# Patient Record
Sex: MALE | Race: WHITE | NOT HISPANIC OR LATINO | Employment: FULL TIME | URBAN - NONMETROPOLITAN AREA
[De-identification: names, ages, dates, MRNs, and addresses within clinical notes are randomized per-mention and may not be internally consistent; named-entity substitution may affect disease eponyms.]

---

## 2023-02-12 PROBLEM — F41.0 PANIC ATTACK: Status: ACTIVE | Noted: 2023-02-12

## 2023-02-12 PROBLEM — I10 HYPERTENSION: Status: ACTIVE | Noted: 2023-02-12

## 2023-02-12 PROBLEM — M25.562 BILATERAL KNEE PAIN: Status: ACTIVE | Noted: 2023-02-12

## 2023-02-12 PROBLEM — M25.561 BILATERAL KNEE PAIN: Status: ACTIVE | Noted: 2023-02-12

## 2023-02-12 PROBLEM — F31.9: Status: ACTIVE | Noted: 2023-02-12

## 2023-02-12 PROBLEM — F41.8 SITUATIONAL ANXIETY: Status: ACTIVE | Noted: 2023-02-12

## 2023-02-12 PROBLEM — B00.1 RECURRENT HERPES LABIALIS: Status: ACTIVE | Noted: 2023-02-12

## 2023-02-12 RX ORDER — ACYCLOVIR 400 MG/1
1 TABLET ORAL 2 TIMES DAILY
COMMUNITY
End: 2023-04-11 | Stop reason: SDUPTHER

## 2023-02-12 RX ORDER — LAMOTRIGINE 200 MG/1
1 TABLET ORAL DAILY
COMMUNITY
End: 2023-04-11 | Stop reason: SDUPTHER

## 2023-02-12 RX ORDER — LISINOPRIL 10 MG/1
1 TABLET ORAL DAILY
COMMUNITY
End: 2023-04-11 | Stop reason: SDUPTHER

## 2023-02-12 RX ORDER — DULOXETIN HYDROCHLORIDE 60 MG/1
1 CAPSULE, DELAYED RELEASE ORAL DAILY
COMMUNITY
End: 2023-03-13 | Stop reason: SDUPTHER

## 2023-03-13 DIAGNOSIS — F41.0 PANIC ATTACK: ICD-10-CM

## 2023-03-13 RX ORDER — DULOXETIN HYDROCHLORIDE 60 MG/1
60 CAPSULE, DELAYED RELEASE ORAL DAILY
Qty: 30 CAPSULE | Refills: 11 | Status: SHIPPED | OUTPATIENT
Start: 2023-03-13 | End: 2023-04-11 | Stop reason: SDUPTHER

## 2023-04-06 RX ORDER — ACYCLOVIR 400 MG/1
TABLET ORAL
Qty: 60 TABLET | Refills: 35 | OUTPATIENT
Start: 2023-04-06

## 2023-04-06 RX ORDER — LISINOPRIL 10 MG/1
TABLET ORAL
Qty: 30 TABLET | Refills: 11 | OUTPATIENT
Start: 2023-04-06

## 2023-04-10 NOTE — TELEPHONE ENCOUNTER
Pt needs a refill on lamictal, lisinopril, and acyclovir.. has an appointment tomorrow. No pharmacy left.

## 2023-04-11 ENCOUNTER — OFFICE VISIT (OUTPATIENT)
Dept: PRIMARY CARE | Facility: CLINIC | Age: 48
End: 2023-04-11
Payer: COMMERCIAL

## 2023-04-11 VITALS
WEIGHT: 163 LBS | HEART RATE: 56 BPM | HEIGHT: 69 IN | BODY MASS INDEX: 24.14 KG/M2 | DIASTOLIC BLOOD PRESSURE: 80 MMHG | SYSTOLIC BLOOD PRESSURE: 130 MMHG

## 2023-04-11 DIAGNOSIS — I10 HYPERTENSION, UNSPECIFIED TYPE: ICD-10-CM

## 2023-04-11 DIAGNOSIS — F31.30 BIPOLAR AFFECTIVE DISORDER, CURRENT EPISODE DEPRESSED, CURRENT EPISODE SEVERITY UNSPECIFIED (MULTI): ICD-10-CM

## 2023-04-11 DIAGNOSIS — B00.1 RECURRENT HERPES LABIALIS: ICD-10-CM

## 2023-04-11 DIAGNOSIS — F41.0 PANIC ATTACK: ICD-10-CM

## 2023-04-11 PROCEDURE — 3075F SYST BP GE 130 - 139MM HG: CPT | Performed by: FAMILY MEDICINE

## 2023-04-11 PROCEDURE — 1036F TOBACCO NON-USER: CPT | Performed by: FAMILY MEDICINE

## 2023-04-11 PROCEDURE — 99214 OFFICE O/P EST MOD 30 MIN: CPT | Performed by: FAMILY MEDICINE

## 2023-04-11 PROCEDURE — 3079F DIAST BP 80-89 MM HG: CPT | Performed by: FAMILY MEDICINE

## 2023-04-11 RX ORDER — LISINOPRIL 10 MG/1
10 TABLET ORAL DAILY
Qty: 90 TABLET | Refills: 3 | Status: SHIPPED | OUTPATIENT
Start: 2023-04-11 | End: 2024-05-09 | Stop reason: SDUPTHER

## 2023-04-11 RX ORDER — ACYCLOVIR 400 MG/1
400 TABLET ORAL 2 TIMES DAILY
Qty: 180 TABLET | Refills: 3 | Status: SHIPPED | OUTPATIENT
Start: 2023-04-11 | End: 2024-04-10

## 2023-04-11 RX ORDER — DULOXETIN HYDROCHLORIDE 60 MG/1
60 CAPSULE, DELAYED RELEASE ORAL DAILY
Qty: 90 CAPSULE | Refills: 3 | Status: SHIPPED | OUTPATIENT
Start: 2023-04-11 | End: 2024-05-09 | Stop reason: SDUPTHER

## 2023-04-11 RX ORDER — LAMOTRIGINE 200 MG/1
200 TABLET ORAL DAILY
Qty: 90 TABLET | Refills: 3 | Status: SHIPPED | OUTPATIENT
Start: 2023-04-11 | End: 2024-05-09 | Stop reason: SDUPTHER

## 2023-04-11 ASSESSMENT — PATIENT HEALTH QUESTIONNAIRE - PHQ9
10. IF YOU CHECKED OFF ANY PROBLEMS, HOW DIFFICULT HAVE THESE PROBLEMS MADE IT FOR YOU TO DO YOUR WORK, TAKE CARE OF THINGS AT HOME, OR GET ALONG WITH OTHER PEOPLE: SOMEWHAT DIFFICULT
2. FEELING DOWN, DEPRESSED OR HOPELESS: SEVERAL DAYS
1. LITTLE INTEREST OR PLEASURE IN DOING THINGS: SEVERAL DAYS
SUM OF ALL RESPONSES TO PHQ9 QUESTIONS 1 AND 2: 2

## 2023-04-11 NOTE — PROGRESS NOTES
"Subjective   Patient ID: Avery Ortiz is a 48 y.o. male who presents for Med Refill.    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   HENT:      Head: Normocephalic and atraumatic.   Eyes:      Conjunctiva/sclera: Conjunctivae normal.      Pupils: Pupils are equal, round, and reactive to light.   Cardiovascular:      Rate and Rhythm: Normal rate and regular rhythm.      Heart sounds: Normal heart sounds.   Pulmonary:      Effort: Pulmonary effort is normal.      Breath sounds: Normal breath sounds.   Lymphadenopathy:      Cervical: No cervical adenopathy.   Skin:     Coloration: Skin is not jaundiced.   Neurological:      General: No focal deficit present.      Mental Status: He is alert and oriented to person, place, and time.   Psychiatric:         Mood and Affect: Mood normal.         Behavior: Behavior normal.         Thought Content: Thought content normal.         Judgment: Judgment normal.         Assessment/Plan   Problem List Items Addressed This Visit          Circulatory    Hypertension       Infectious/Inflammatory    Recurrent herpes labialis       Other    Panic attack     Other Visit Diagnoses       Bipolar affective disorder, current episode " depressed, current episode severity unspecified (CMS/HCC)

## 2023-10-13 ENCOUNTER — OFFICE VISIT (OUTPATIENT)
Dept: PRIMARY CARE | Facility: CLINIC | Age: 48
End: 2023-10-13
Payer: COMMERCIAL

## 2023-10-13 VITALS
WEIGHT: 163.8 LBS | HEART RATE: 75 BPM | SYSTOLIC BLOOD PRESSURE: 120 MMHG | OXYGEN SATURATION: 97 % | BODY MASS INDEX: 24.26 KG/M2 | DIASTOLIC BLOOD PRESSURE: 78 MMHG | HEIGHT: 69 IN

## 2023-10-13 DIAGNOSIS — F31.30 BIPOLAR AFFECTIVE DISORDER, CURRENT EPISODE DEPRESSED, CURRENT EPISODE SEVERITY UNSPECIFIED (MULTI): ICD-10-CM

## 2023-10-13 DIAGNOSIS — N45.1 EPIDIDYMITIS: Primary | ICD-10-CM

## 2023-10-13 PROCEDURE — 1036F TOBACCO NON-USER: CPT | Performed by: FAMILY MEDICINE

## 2023-10-13 PROCEDURE — 3078F DIAST BP <80 MM HG: CPT | Performed by: FAMILY MEDICINE

## 2023-10-13 PROCEDURE — 99214 OFFICE O/P EST MOD 30 MIN: CPT | Performed by: FAMILY MEDICINE

## 2023-10-13 PROCEDURE — 3074F SYST BP LT 130 MM HG: CPT | Performed by: FAMILY MEDICINE

## 2023-10-13 RX ORDER — LEVOFLOXACIN 500 MG/1
500 TABLET, FILM COATED ORAL DAILY
Qty: 10 TABLET | Refills: 0 | Status: SHIPPED | OUTPATIENT
Start: 2023-10-13 | End: 2023-10-23

## 2023-10-13 NOTE — PROGRESS NOTES
"Subjective   Patient ID: Avery Ortiz is a 48 y.o. male who presents for 6 month (Pt. C/o testicular pain x 1 day).    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       Takes 30 to 45 mintues to fall asleep        Feels the best he has in 20 years      Epigastric pain      EGD March 2022 mild gastritis      US stone had cholecystectomy      HTN stable on meds        Behind scrotum yesterday am   Left side yesterday now both  No dysuria no dc   No swelling   Had vasectomy 7 years   Hernia right repair has child   No hematuria   No problems with sex   No h/o uti  Nocturia x 1     States no change in sex partners  No h/o chlamydia    Prostate cancer screening    · Prostate Spec.Ag, 0.79  12/6/22  Colon cancer screening March 2023 repeat 10 years  per Dr Varela  Review of Systems    Objective   /78   Pulse 75   Ht 1.753 m (5' 9\")   Wt 74.3 kg (163 lb 12.8 oz)   SpO2 97%   BMI 24.19 kg/m²     Physical Exam  Vitals reviewed.   Constitutional:       General: He is not in acute distress.     Appearance: Normal appearance.   HENT:      Head: Normocephalic and atraumatic.   Eyes:      Conjunctiva/sclera: Conjunctivae normal.   Cardiovascular:      Rate and Rhythm: Normal rate and regular rhythm.      Heart sounds: No murmur heard.  Pulmonary:      Effort: Pulmonary effort is normal.      Breath sounds: Normal breath sounds.   Abdominal:      General: Abdomen is flat. Bowel sounds are normal.      Palpations: Abdomen is soft. There is no mass.   Genitourinary:     Penis: Normal.       Comments: Has metal ring in urethral meatus   Right Epididymis is very tender.  Status postvasectomy reviewed no inguinal hernias.  Lymphadenopathy:      Cervical: No cervical adenopathy.   Skin:     General: Skin is warm and dry.   Neurological:      General: No focal deficit present.      Mental Status: He is alert and oriented to " person, place, and time.   Psychiatric:         Mood and Affect: Mood normal.         Judgment: Judgment normal.         Assessment/Plan   Diagnoses and all orders for this visit:  Epididymitis  -     levoFLOXacin (Levaquin) 500 mg tablet; Take 1 tablet (500 mg) by mouth once daily for 10 days.  Bipolar affective disorder, current episode depressed, current episode severity unspecified (CMS/[facility])  No improvement after 7 days we will get GC chlamydia urine culture UA.

## 2023-11-01 ENCOUNTER — TELEPHONE (OUTPATIENT)
Dept: PRIMARY CARE | Facility: CLINIC | Age: 48
End: 2023-11-01
Payer: COMMERCIAL

## 2023-11-01 DIAGNOSIS — I10 HYPERTENSION, UNSPECIFIED TYPE: ICD-10-CM

## 2023-11-01 DIAGNOSIS — B00.1 RECURRENT HERPES LABIALIS: ICD-10-CM

## 2023-11-01 DIAGNOSIS — F31.30 BIPOLAR AFFECTIVE DISORDER, CURRENT EPISODE DEPRESSED, CURRENT EPISODE SEVERITY UNSPECIFIED (MULTI): ICD-10-CM

## 2023-11-01 RX ORDER — LAMOTRIGINE 200 MG/1
TABLET ORAL DAILY
Qty: 30 TABLET | Refills: 32 | OUTPATIENT
Start: 2023-11-01

## 2023-11-01 RX ORDER — LISINOPRIL 10 MG/1
10 TABLET ORAL DAILY
Qty: 30 TABLET | Refills: 11 | OUTPATIENT
Start: 2023-11-01

## 2023-11-01 RX ORDER — ACYCLOVIR 400 MG/1
400 TABLET ORAL 2 TIMES DAILY
Qty: 60 TABLET | Refills: 35 | OUTPATIENT
Start: 2023-11-01

## 2023-11-02 NOTE — TELEPHONE ENCOUNTER
Pharmacy is telling patient that they need approval for all 4 of his prescriptions, please call pharmacy [facility]

## 2024-05-03 DIAGNOSIS — B00.1 RECURRENT HERPES LABIALIS: ICD-10-CM

## 2024-05-03 DIAGNOSIS — I10 HYPERTENSION, UNSPECIFIED TYPE: ICD-10-CM

## 2024-05-03 DIAGNOSIS — F41.0 PANIC ATTACK: ICD-10-CM

## 2024-05-03 DIAGNOSIS — F31.30 BIPOLAR AFFECTIVE DISORDER, CURRENT EPISODE DEPRESSED, CURRENT EPISODE SEVERITY UNSPECIFIED (MULTI): ICD-10-CM

## 2024-05-03 RX ORDER — DULOXETIN HYDROCHLORIDE 60 MG/1
60 CAPSULE, DELAYED RELEASE ORAL DAILY
Qty: 90 CAPSULE | Refills: 3 | OUTPATIENT
Start: 2024-05-03

## 2024-05-03 RX ORDER — LAMOTRIGINE 200 MG/1
TABLET ORAL DAILY
Qty: 90 TABLET | Refills: 3 | OUTPATIENT
Start: 2024-05-03

## 2024-05-03 RX ORDER — LISINOPRIL 10 MG/1
10 TABLET ORAL DAILY
Qty: 90 TABLET | Refills: 3 | OUTPATIENT
Start: 2024-05-03

## 2024-05-03 RX ORDER — ACYCLOVIR 400 MG/1
400 TABLET ORAL 2 TIMES DAILY
Qty: 180 TABLET | Refills: 3 | OUTPATIENT
Start: 2024-05-03 | End: 2025-05-03

## 2024-05-09 ENCOUNTER — OFFICE VISIT (OUTPATIENT)
Dept: PRIMARY CARE | Facility: CLINIC | Age: 49
End: 2024-05-09
Payer: COMMERCIAL

## 2024-05-09 VITALS
TEMPERATURE: 98 F | OXYGEN SATURATION: 97 % | WEIGHT: 152.7 LBS | HEART RATE: 66 BPM | DIASTOLIC BLOOD PRESSURE: 72 MMHG | BODY MASS INDEX: 22.55 KG/M2 | SYSTOLIC BLOOD PRESSURE: 114 MMHG

## 2024-05-09 DIAGNOSIS — I10 HYPERTENSION, UNSPECIFIED TYPE: ICD-10-CM

## 2024-05-09 DIAGNOSIS — F41.0 PANIC ATTACK: ICD-10-CM

## 2024-05-09 DIAGNOSIS — B00.1 RECURRENT HERPES LABIALIS: Primary | ICD-10-CM

## 2024-05-09 DIAGNOSIS — F31.30 BIPOLAR AFFECTIVE DISORDER, CURRENT EPISODE DEPRESSED, CURRENT EPISODE SEVERITY UNSPECIFIED (MULTI): ICD-10-CM

## 2024-05-09 PROCEDURE — 1036F TOBACCO NON-USER: CPT | Performed by: FAMILY MEDICINE

## 2024-05-09 PROCEDURE — 3078F DIAST BP <80 MM HG: CPT | Performed by: FAMILY MEDICINE

## 2024-05-09 PROCEDURE — 99214 OFFICE O/P EST MOD 30 MIN: CPT | Performed by: FAMILY MEDICINE

## 2024-05-09 PROCEDURE — 3074F SYST BP LT 130 MM HG: CPT | Performed by: FAMILY MEDICINE

## 2024-05-09 RX ORDER — ACYCLOVIR 400 MG/1
400 TABLET ORAL 2 TIMES DAILY
Qty: 180 TABLET | Refills: 3 | Status: SHIPPED | OUTPATIENT
Start: 2024-05-09 | End: 2025-05-09

## 2024-05-09 RX ORDER — DULOXETIN HYDROCHLORIDE 60 MG/1
60 CAPSULE, DELAYED RELEASE ORAL DAILY
Qty: 90 CAPSULE | Refills: 3 | Status: SHIPPED | OUTPATIENT
Start: 2024-05-09 | End: 2025-05-09

## 2024-05-09 RX ORDER — LAMOTRIGINE 200 MG/1
200 TABLET ORAL DAILY
Qty: 90 TABLET | Refills: 3 | Status: SHIPPED | OUTPATIENT
Start: 2024-05-09 | End: 2025-05-09

## 2024-05-09 RX ORDER — LISINOPRIL 10 MG/1
10 TABLET ORAL DAILY
Qty: 90 TABLET | Refills: 3 | Status: SHIPPED | OUTPATIENT
Start: 2024-05-09 | End: 2025-05-09

## 2024-05-09 NOTE — LETTER
May 9, 2024     Patient: Avery Ortiz   YOB: 1975   Date of Visit: 5/9/2024       To Whom It May Concern:    Avery Ortiz was seen in my clinic on 5/9/2024 at 10:00 am. Please excuse Avery for his absence from work on this day to make the appointment.    Patient has gallstones and has associated symptoms of nausea, vomitting, diarrhea and abdominal pain.  He is not infectious and no restrictions on work.     If you have any questions or concerns, please don't hesitate to call.         Sincerely,         Babar Brito MD        CC: No Recipients

## 2024-05-09 NOTE — PROGRESS NOTES
Subjective   Patient ID: Avery Ortiz is a 49 y.o. male who presents for Hypertension, Bipolar, Panic attacks, and gall bladder pain/vomiting (Requesting a letter to explain his situation, N/V, diarrhea symptoms to due his gallbladder so he doesn't miss work.  He has an appointment with Dr Varela on Monday to set up surgery date. ).    HPI     Chronic bipolar disorder      Mood is good at times work stress can get really depressed and sometimes misses works      Has been having no manic episode       sleep gets about 4 to 5 hours with tossing and turning since high school       Lays awake in bed for 30 to 60       Has to count to 300      Insmonia  shift work      Discussed sleep hygiene and melatonin     Epigastric pain      EGD March 2022 mild gastritis      Dr Varela for evaluation next week for gallstones      Was going to get taken out      But due to job was not able to get surgery      Works for [employer]  and needs note that he has gallbladder illness      HTN stable on meds     Acyclovir  for oral herpes simplex    Review of Systems    Objective   /72 (BP Location: Left arm, Patient Position: Sitting)   Pulse 66   Temp 36.7 °C (98 °F)   Wt 69.3 kg (152 lb 11.2 oz)   SpO2 97%   BMI 22.55 kg/m²     Physical Exam  Vitals reviewed.   Constitutional:       General: He is not in acute distress.     Appearance: Normal appearance.   HENT:      Head: Normocephalic and atraumatic.   Eyes:      Conjunctiva/sclera: Conjunctivae normal.      Pupils: Pupils are equal, round, and reactive to light.   Cardiovascular:      Rate and Rhythm: Normal rate and regular rhythm.      Heart sounds: Normal heart sounds. No murmur heard.  Pulmonary:      Effort: Pulmonary effort is normal.      Breath sounds: Normal breath sounds.   Abdominal:      General: Abdomen is flat. Bowel sounds are normal.      Palpations: Abdomen is soft. There is no mass.      Tenderness: There is guarding (RUQ).   Lymphadenopathy:       Cervical: No cervical adenopathy.   Skin:     General: Skin is warm and dry.      Coloration: Skin is not jaundiced.   Neurological:      General: No focal deficit present.      Mental Status: He is alert and oriented to person, place, and time.   Psychiatric:         Mood and Affect: Mood normal.         Behavior: Behavior normal.         Thought Content: Thought content normal.         Judgment: Judgment normal.         Assessment/Plan   Diagnoses and all orders for this visit:  Recurrent herpes labialis  -     acyclovir (Zovirax) 400 mg tablet; Take 1 tablet (400 mg) by mouth 2 times a day.  Bipolar affective disorder, current episode depressed, current episode severity unspecified (Multi)  -     lamoTRIgine (LaMICtal) 200 mg tablet; Take 1 tablet (200 mg) by mouth once daily.  Hypertension, unspecified type  -     lisinopril 10 mg tablet; Take 1 tablet (10 mg) by mouth once daily.  Panic attack  -     DULoxetine (Cymbalta) 60 mg DR capsule; Take 1 capsule (60 mg) by mouth once daily.

## 2024-05-10 ENCOUNTER — PREP FOR PROCEDURE (OUTPATIENT)
Dept: SURGERY | Facility: CLINIC | Age: 49
End: 2024-05-10
Payer: COMMERCIAL

## 2024-05-13 ENCOUNTER — OFFICE VISIT (OUTPATIENT)
Dept: SURGERY | Facility: CLINIC | Age: 49
End: 2024-05-13
Payer: COMMERCIAL

## 2024-05-13 ENCOUNTER — DOCUMENTATION (OUTPATIENT)
Dept: SURGERY | Facility: CLINIC | Age: 49
End: 2024-05-13

## 2024-05-13 VITALS
DIASTOLIC BLOOD PRESSURE: 62 MMHG | WEIGHT: 153.4 LBS | HEART RATE: 71 BPM | HEIGHT: 69 IN | BODY MASS INDEX: 22.72 KG/M2 | SYSTOLIC BLOOD PRESSURE: 100 MMHG

## 2024-05-13 DIAGNOSIS — K80.20 SYMPTOMATIC CHOLELITHIASIS: Primary | ICD-10-CM

## 2024-05-13 PROCEDURE — 3074F SYST BP LT 130 MM HG: CPT | Performed by: SURGERY

## 2024-05-13 PROCEDURE — 99214 OFFICE O/P EST MOD 30 MIN: CPT | Performed by: SURGERY

## 2024-05-13 PROCEDURE — 3078F DIAST BP <80 MM HG: CPT | Performed by: SURGERY

## 2024-05-13 PROCEDURE — 1036F TOBACCO NON-USER: CPT | Performed by: SURGERY

## 2024-05-13 NOTE — H&P (VIEW-ONLY)
General Surgery Follow-up Visit    Patient: Avery Ortiz  : 1975  MRN: [id number]  Date of Visit: 24    Chief Complaint: Intermittent upper abdominal pain    History of Present Illness: Avery Ortiz is a 49 y.o. old male who I previously evaluated a year ago for postprandial epigastric pain.  He had what was either a gallbladder polyp versus small stone seen on ultrasound, but no inflammatory changes of the gallbladder or biliary ductal dilation.  He had a colonoscopy that was normal.  He had an upper endoscopy that showed very mild chronic gastritis.  His upper abdominal pain was thought more likely to be secondary to the gallbladder and cholecystectomy was offered.  However, he did not proceed with surgery due to inflexibility with his work schedule.  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  He presents today for reevaluation and would like to proceed with cholecystectomy.  He continues to get epigastric pain a couple times per week.  When he gets this pain, it sometimes lasts for a few days.  He has nausea associated with this and occasionally vomits.  He also occasionally gets loose bowel movements with these episodes of pain.  He denies any yellowing of the skin or eyes or dark-colored urine.  He denies any fever.    Medical History:  Symptomatic cholelithiasis versus gallbladder polyp  Hypertension    Surgical History:  EGD/colonoscopy, 2023, 10-year surveillance recommended  Right inguinal hernia repair as a child  Tonsillectomy with adenoidectomy  Nose surgery  [place] tooth extraction    Home Medications:  Prior to Admission medications    Medication Sig Start Date End Date Taking? Authorizing Provider   acyclovir (Zovirax) 400 mg tablet Take 1 tablet (400 mg) by mouth 2 times a day. 24  Babar Brito MD   DULoxetine (Cymbalta) 60 mg DR capsule Take 1 capsule (60 mg) by mouth once daily. 24  Babar Brito MD   lamoTRIgine (LaMICtal)  "200 mg tablet Take 1 tablet (200 mg) by mouth once daily. 5/9/24 5/9/25  Babar Brito MD   lisinopril 10 mg tablet Take 1 tablet (10 mg) by mouth once daily. 5/9/24 5/9/25  Babar Brito MD   DULoxetine (Cymbalta) 60 mg DR capsule Take 1 capsule (60 mg) by mouth once daily. 4/11/23 5/9/24  Babar Brito MD   lamoTRIgine (LaMICtal) 200 mg tablet Take 1 tablet (200 mg) by mouth once daily. 4/11/23 5/9/24  Babar Brito MD   lisinopril 10 mg tablet Take 1 tablet (10 mg) by mouth once daily. 4/11/23 5/9/24  Babar Brito MD     Allergies:  No Known Allergies.    Family History:   Hypertension, COPD, cirrhosis    Social History:  .  Former smoker.  Uses marijuana fairly regularly.  No alcohol use.    ROS:  Constitutional:  no fever, sweats, and chills  Cardiovascular: No chest pain  Respiratory: No cough or shortness of breath  Gastrointestinal: + Postprandial epigastric and right upper quadrant abdominal pain.  Associated nausea.  Occasional vomiting.  Occasional loose stool with this pain.  Certain foods including greasy foods elicit this pain.  Genitourinary: no dark-colored urine  Musculoskeletal: no weakness or swelling  Integumentary: no jaundice  Neurological: no confusion  Endocrine: no heat or cold intolerance  Heme/Lymph: no easy bruising or bleeding    Objective:  /62   Pulse 71   Ht 1.753 m (5' 9\")   Wt 69.6 kg (153 lb 6.4 oz)   BMI 22.65 kg/m²     Physical Exam:  Constitutional: No acute distress, conversant, pleasant  Neurologic: alert and oriented  Psych: appropriate affect  Ears, Nose, Mouth and Throat: mucus membranes moist  Pulmonary: No labored breathing  Cardiovascular: Regular rate and rhythm  Abdomen: soft, non-distended, thin with BMI 23, no upper abdominal surgical scars, tender to palpation in the epigastric region and right upper quadrant  Musculoskeletal: Moves all extremities, no edema  Skin: no jaundice    Labs:  Labs from 2/6/2022 reviewed: Hgb 14.7, Plts " 254, LFT's wnl    Imaging:  Right upper quadrant ultrasound from 12/9/22 reviewed: Distended gallbladder, likely a tiny adherent gallstone and/or tiny gallbladder polyps.  No biliary ductal dilation, CBD measures 2 mm.  No wall thickening or ultrasonic Nolan sign.    Assessment and Plan: Avery Ortiz is a 49 y.o. old male with symptomatic cholelithiasis versus gallbladder polyp.  I have recommended cholecystectomy.  Risks, benefits and alternatives of laparoscopic cholecystectomy were discussed with the patient.  This included risk of bleeding, infection, viscous injury, conversion to an open procedure, bile leakage or bile duct injury, post-cholecystectomy diarrhea, possible non-resolution or recurrence of symptoms, and possible need for subsequent endoscopic or surgical interventions. The patient was agreeable to proceed with surgery and is scheduled for laparoscopic cholecystectomy on 5/29/24.    Arleen Varela MD  5/13/2024

## 2024-05-13 NOTE — PROGRESS NOTES
Notified patient of Lap brent  scheduled on 5-29-24   .Instructions reviewed. Advised patient [facility] will contact them 24 hours before surgery with arrival time. Pt voices understanding. Rosaura Pichardo MA.

## 2024-05-13 NOTE — PROGRESS NOTES
General Surgery Follow-up Visit    Patient: Avery Ortiz  : 1975  MRN: [id number]  Date of Visit: 24    Chief Complaint: Intermittent upper abdominal pain    History of Present Illness: Avery Ortiz is a 49 y.o. old male who I previously evaluated a year ago for postprandial epigastric pain.  He had what was either a gallbladder polyp versus small stone seen on ultrasound, but no inflammatory changes of the gallbladder or biliary ductal dilation.  He had a colonoscopy that was normal.  He had an upper endoscopy that showed very mild chronic gastritis.  His upper abdominal pain was thought more likely to be secondary to the gallbladder and cholecystectomy was offered.  However, he did not proceed with surgery due to inflexibility with his work schedule.  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  He presents today for reevaluation and would like to proceed with cholecystectomy.  He continues to get epigastric pain a couple times per week.  When he gets this pain, it sometimes lasts for a few days.  He has nausea associated with this and occasionally vomits.  He also occasionally gets loose bowel movements with these episodes of pain.  He denies any yellowing of the skin or eyes or dark-colored urine.  He denies any fever.    Medical History:  Symptomatic cholelithiasis versus gallbladder polyp  Hypertension    Surgical History:  EGD/colonoscopy, 2023, 10-year surveillance recommended  Right inguinal hernia repair as a child  Tonsillectomy with adenoidectomy  Nose surgery  [place] tooth extraction    Home Medications:  Prior to Admission medications    Medication Sig Start Date End Date Taking? Authorizing Provider   acyclovir (Zovirax) 400 mg tablet Take 1 tablet (400 mg) by mouth 2 times a day. 24  Babar Brito MD   DULoxetine (Cymbalta) 60 mg DR capsule Take 1 capsule (60 mg) by mouth once daily. 24  Babar Brito MD   lamoTRIgine (LaMICtal)  "200 mg tablet Take 1 tablet (200 mg) by mouth once daily. 5/9/24 5/9/25  Babar Brito MD   lisinopril 10 mg tablet Take 1 tablet (10 mg) by mouth once daily. 5/9/24 5/9/25  Babar Brito MD   DULoxetine (Cymbalta) 60 mg DR capsule Take 1 capsule (60 mg) by mouth once daily. 4/11/23 5/9/24  Babar Brito MD   lamoTRIgine (LaMICtal) 200 mg tablet Take 1 tablet (200 mg) by mouth once daily. 4/11/23 5/9/24  Babar Brito MD   lisinopril 10 mg tablet Take 1 tablet (10 mg) by mouth once daily. 4/11/23 5/9/24  Babar Brito MD     Allergies:  No Known Allergies.    Family History:   Hypertension, COPD, cirrhosis    Social History:  .  Former smoker.  Uses marijuana fairly regularly.  No alcohol use.    ROS:  Constitutional:  no fever, sweats, and chills  Cardiovascular: No chest pain  Respiratory: No cough or shortness of breath  Gastrointestinal: + Postprandial epigastric and right upper quadrant abdominal pain.  Associated nausea.  Occasional vomiting.  Occasional loose stool with this pain.  Certain foods including greasy foods elicit this pain.  Genitourinary: no dark-colored urine  Musculoskeletal: no weakness or swelling  Integumentary: no jaundice  Neurological: no confusion  Endocrine: no heat or cold intolerance  Heme/Lymph: no easy bruising or bleeding    Objective:  /62   Pulse 71   Ht 1.753 m (5' 9\")   Wt 69.6 kg (153 lb 6.4 oz)   BMI 22.65 kg/m²     Physical Exam:  Constitutional: No acute distress, conversant, pleasant  Neurologic: alert and oriented  Psych: appropriate affect  Ears, Nose, Mouth and Throat: mucus membranes moist  Pulmonary: No labored breathing  Cardiovascular: Regular rate and rhythm  Abdomen: soft, non-distended, thin with BMI 23, no upper abdominal surgical scars, tender to palpation in the epigastric region and right upper quadrant  Musculoskeletal: Moves all extremities, no edema  Skin: no jaundice    Labs:  Labs from 2/6/2022 reviewed: Hgb 14.7, Plts " 254, LFT's wnl    Imaging:  Right upper quadrant ultrasound from 12/9/22 reviewed: Distended gallbladder, likely a tiny adherent gallstone and/or tiny gallbladder polyps.  No biliary ductal dilation, CBD measures 2 mm.  No wall thickening or ultrasonic Nolan sign.    Assessment and Plan: Avery Ortiz is a 49 y.o. old male with symptomatic cholelithiasis versus gallbladder polyp.  I have recommended cholecystectomy.  Risks, benefits and alternatives of laparoscopic cholecystectomy were discussed with the patient.  This included risk of bleeding, infection, viscous injury, conversion to an open procedure, bile leakage or bile duct injury, post-cholecystectomy diarrhea, possible non-resolution or recurrence of symptoms, and possible need for subsequent endoscopic or surgical interventions. The patient was agreeable to proceed with surgery and is scheduled for laparoscopic cholecystectomy on 5/29/24.    Arleen Varela MD  5/13/2024

## 2024-05-28 NOTE — PREPROCEDURE INSTRUCTIONS
No outpatient medications have been marked as taking for the 5/29/24 encounter (Hospital Encounter).                       NPO Instructions:    Nothing to eat or drink after midnight    Additional Instructions:     Will need  home, arrive on 2nd floor at hospital at 1030 am on Wednesday may 29

## 2024-05-29 ENCOUNTER — ANESTHESIA EVENT (OUTPATIENT)
Dept: OPERATING ROOM | Facility: HOSPITAL | Age: 49
End: 2024-05-29
Payer: COMMERCIAL

## 2024-05-29 ENCOUNTER — HOSPITAL ENCOUNTER (OUTPATIENT)
Facility: HOSPITAL | Age: 49
Setting detail: OUTPATIENT SURGERY
Discharge: HOME | End: 2024-05-29
Attending: SURGERY | Admitting: SURGERY
Payer: COMMERCIAL

## 2024-05-29 ENCOUNTER — ANESTHESIA (OUTPATIENT)
Dept: OPERATING ROOM | Facility: HOSPITAL | Age: 49
End: 2024-05-29
Payer: COMMERCIAL

## 2024-05-29 ENCOUNTER — PHARMACY VISIT (OUTPATIENT)
Dept: PHARMACY | Facility: CLINIC | Age: 49
End: 2024-05-29

## 2024-05-29 VITALS
SYSTOLIC BLOOD PRESSURE: 134 MMHG | TEMPERATURE: 97.8 F | HEIGHT: 69 IN | HEART RATE: 74 BPM | OXYGEN SATURATION: 98 % | BODY MASS INDEX: 22.82 KG/M2 | RESPIRATION RATE: 16 BRPM | WEIGHT: 154.1 LBS | DIASTOLIC BLOOD PRESSURE: 78 MMHG

## 2024-05-29 DIAGNOSIS — K80.20 SYMPTOMATIC CHOLELITHIASIS: ICD-10-CM

## 2024-05-29 DIAGNOSIS — G89.18 POST-OPERATIVE PAIN: Primary | ICD-10-CM

## 2024-05-29 PROCEDURE — 2500000004 HC RX 250 GENERAL PHARMACY W/ HCPCS (ALT 636 FOR OP/ED): Performed by: ANESTHESIOLOGY

## 2024-05-29 PROCEDURE — 7100000002 HC RECOVERY ROOM TIME - EACH INCREMENTAL 1 MINUTE: Performed by: SURGERY

## 2024-05-29 PROCEDURE — 3600000003 HC OR TIME - INITIAL BASE CHARGE - PROCEDURE LEVEL THREE: Performed by: SURGERY

## 2024-05-29 PROCEDURE — 2500000005 HC RX 250 GENERAL PHARMACY W/O HCPCS: Performed by: ANESTHESIOLOGY

## 2024-05-29 PROCEDURE — 47562 LAPAROSCOPIC CHOLECYSTECTOMY: CPT | Performed by: SURGERY

## 2024-05-29 PROCEDURE — 88304 TISSUE EXAM BY PATHOLOGIST: CPT | Mod: TC,SAMLAB | Performed by: SURGERY

## 2024-05-29 PROCEDURE — 2500000001 HC RX 250 WO HCPCS SELF ADMINISTERED DRUGS (ALT 637 FOR MEDICARE OP): Performed by: ANESTHESIOLOGY

## 2024-05-29 PROCEDURE — 7100000010 HC PHASE TWO TIME - EACH INCREMENTAL 1 MINUTE: Performed by: SURGERY

## 2024-05-29 PROCEDURE — 3600000008 HC OR TIME - EACH INCREMENTAL 1 MINUTE - PROCEDURE LEVEL THREE: Performed by: SURGERY

## 2024-05-29 PROCEDURE — RXMED WILLOW AMBULATORY MEDICATION CHARGE

## 2024-05-29 PROCEDURE — 3700000002 HC GENERAL ANESTHESIA TIME - EACH INCREMENTAL 1 MINUTE: Performed by: SURGERY

## 2024-05-29 PROCEDURE — 3700000001 HC GENERAL ANESTHESIA TIME - INITIAL BASE CHARGE: Performed by: SURGERY

## 2024-05-29 PROCEDURE — 7100000009 HC PHASE TWO TIME - INITIAL BASE CHARGE: Performed by: SURGERY

## 2024-05-29 PROCEDURE — 2500000004 HC RX 250 GENERAL PHARMACY W/ HCPCS (ALT 636 FOR OP/ED): Performed by: SURGERY

## 2024-05-29 PROCEDURE — 2780000003 HC OR 278 NO HCPCS: Performed by: SURGERY

## 2024-05-29 PROCEDURE — 7100000001 HC RECOVERY ROOM TIME - INITIAL BASE CHARGE: Performed by: SURGERY

## 2024-05-29 PROCEDURE — 2720000007 HC OR 272 NO HCPCS: Performed by: SURGERY

## 2024-05-29 RX ORDER — PROPOFOL 10 MG/ML
INJECTION, EMULSION INTRAVENOUS AS NEEDED
Status: DISCONTINUED | OUTPATIENT
Start: 2024-05-29 | End: 2024-05-29

## 2024-05-29 RX ORDER — OXYCODONE HYDROCHLORIDE 5 MG/1
5 TABLET ORAL EVERY 4 HOURS PRN
Status: DISCONTINUED | OUTPATIENT
Start: 2024-05-29 | End: 2024-05-29 | Stop reason: HOSPADM

## 2024-05-29 RX ORDER — HYDROMORPHONE HYDROCHLORIDE 2 MG/ML
0.5 INJECTION, SOLUTION INTRAMUSCULAR; INTRAVENOUS; SUBCUTANEOUS EVERY 5 MIN PRN
Status: DISCONTINUED | OUTPATIENT
Start: 2024-05-29 | End: 2024-05-29 | Stop reason: HOSPADM

## 2024-05-29 RX ORDER — BUPIVACAINE HYDROCHLORIDE 2.5 MG/ML
INJECTION, SOLUTION EPIDURAL; INFILTRATION; INTRACAUDAL AS NEEDED
Status: DISCONTINUED | OUTPATIENT
Start: 2024-05-29 | End: 2024-05-29 | Stop reason: HOSPADM

## 2024-05-29 RX ORDER — SODIUM CHLORIDE, SODIUM LACTATE, POTASSIUM CHLORIDE, CALCIUM CHLORIDE 600; 310; 30; 20 MG/100ML; MG/100ML; MG/100ML; MG/100ML
100 INJECTION, SOLUTION INTRAVENOUS CONTINUOUS
Status: DISCONTINUED | OUTPATIENT
Start: 2024-05-29 | End: 2024-05-29 | Stop reason: HOSPADM

## 2024-05-29 RX ORDER — LIDOCAINE HYDROCHLORIDE 10 MG/ML
INJECTION, SOLUTION EPIDURAL; INFILTRATION; INTRACAUDAL; PERINEURAL AS NEEDED
Status: DISCONTINUED | OUTPATIENT
Start: 2024-05-29 | End: 2024-05-29

## 2024-05-29 RX ORDER — ONDANSETRON HYDROCHLORIDE 2 MG/ML
4 INJECTION, SOLUTION INTRAVENOUS ONCE AS NEEDED
Status: COMPLETED | OUTPATIENT
Start: 2024-05-29 | End: 2024-05-29

## 2024-05-29 RX ORDER — SUCCINYLCHOLINE CHLORIDE 100 MG/5ML
SYRINGE (ML) INTRAVENOUS AS NEEDED
Status: DISCONTINUED | OUTPATIENT
Start: 2024-05-29 | End: 2024-05-29

## 2024-05-29 RX ORDER — FENTANYL CITRATE 50 UG/ML
INJECTION, SOLUTION INTRAMUSCULAR; INTRAVENOUS AS NEEDED
Status: DISCONTINUED | OUTPATIENT
Start: 2024-05-29 | End: 2024-05-29

## 2024-05-29 RX ORDER — ONDANSETRON HYDROCHLORIDE 2 MG/ML
4 INJECTION, SOLUTION INTRAVENOUS ONCE
Status: COMPLETED | OUTPATIENT
Start: 2024-05-29 | End: 2024-05-29

## 2024-05-29 RX ORDER — CEFOXITIN SODIUM 2 G/50ML
2 INJECTION, SOLUTION INTRAVENOUS ONCE
Status: COMPLETED | OUTPATIENT
Start: 2024-05-29 | End: 2024-05-29

## 2024-05-29 RX ORDER — OXYCODONE AND ACETAMINOPHEN 5; 325 MG/1; MG/1
1 TABLET ORAL EVERY 6 HOURS PRN
Qty: 8 TABLET | Refills: 0 | Status: SHIPPED | OUTPATIENT
Start: 2024-05-29 | End: 2024-05-31

## 2024-05-29 RX ORDER — MIDAZOLAM HYDROCHLORIDE 1 MG/ML
2 INJECTION INTRAMUSCULAR; INTRAVENOUS ONCE AS NEEDED
Status: COMPLETED | OUTPATIENT
Start: 2024-05-29 | End: 2024-05-29

## 2024-05-29 RX ORDER — DEXAMETHASONE SODIUM PHOSPHATE 10 MG/ML
5 INJECTION INTRAMUSCULAR; INTRAVENOUS ONCE
Status: COMPLETED | OUTPATIENT
Start: 2024-05-29 | End: 2024-05-29

## 2024-05-29 RX ADMIN — HYDROMORPHONE HYDROCHLORIDE 0.5 MG: 2 INJECTION, SOLUTION INTRAMUSCULAR; INTRAVENOUS; SUBCUTANEOUS at 16:10

## 2024-05-29 RX ADMIN — ONDANSETRON 4 MG: 2 INJECTION INTRAMUSCULAR; INTRAVENOUS at 15:55

## 2024-05-29 RX ADMIN — FENTANYL CITRATE 100 MCG: 50 INJECTION, SOLUTION INTRAMUSCULAR; INTRAVENOUS at 14:26

## 2024-05-29 RX ADMIN — ONDANSETRON 4 MG: 2 INJECTION INTRAMUSCULAR; INTRAVENOUS at 13:07

## 2024-05-29 RX ADMIN — PROPOFOL 50 MG: 10 INJECTION, EMULSION INTRAVENOUS at 15:17

## 2024-05-29 RX ADMIN — Medication 100 MG: at 14:26

## 2024-05-29 RX ADMIN — OXYCODONE HYDROCHLORIDE 5 MG: 5 TABLET ORAL at 16:39

## 2024-05-29 RX ADMIN — SODIUM CHLORIDE, POTASSIUM CHLORIDE, SODIUM LACTATE AND CALCIUM CHLORIDE: 600; 310; 30; 20 INJECTION, SOLUTION INTRAVENOUS at 14:46

## 2024-05-29 RX ADMIN — DEXAMETHASONE SODIUM PHOSPHATE 5 MG: 10 INJECTION, SOLUTION INTRAMUSCULAR; INTRAVENOUS at 13:08

## 2024-05-29 RX ADMIN — SODIUM CHLORIDE, POTASSIUM CHLORIDE, SODIUM LACTATE AND CALCIUM CHLORIDE 100 ML/HR: 600; 310; 30; 20 INJECTION, SOLUTION INTRAVENOUS at 13:07

## 2024-05-29 RX ADMIN — LIDOCAINE HYDROCHLORIDE 5 ML: 10 INJECTION, SOLUTION EPIDURAL; INFILTRATION; INTRACAUDAL; PERINEURAL at 14:26

## 2024-05-29 RX ADMIN — HYDROMORPHONE HYDROCHLORIDE 0.5 MG: 2 INJECTION, SOLUTION INTRAMUSCULAR; INTRAVENOUS; SUBCUTANEOUS at 16:20

## 2024-05-29 RX ADMIN — CEFOXITIN SODIUM 2 G: 2 INJECTION, SOLUTION INTRAVENOUS at 14:00

## 2024-05-29 RX ADMIN — PROPOFOL 150 MG: 10 INJECTION, EMULSION INTRAVENOUS at 14:26

## 2024-05-29 RX ADMIN — HYDROMORPHONE HYDROCHLORIDE 0.5 MG: 2 INJECTION, SOLUTION INTRAMUSCULAR; INTRAVENOUS; SUBCUTANEOUS at 15:55

## 2024-05-29 RX ADMIN — FENTANYL CITRATE 100 MCG: 50 INJECTION, SOLUTION INTRAMUSCULAR; INTRAVENOUS at 15:17

## 2024-05-29 RX ADMIN — MIDAZOLAM HYDROCHLORIDE 2 MG: 1 INJECTION, SOLUTION INTRAMUSCULAR; INTRAVENOUS at 14:08

## 2024-05-29 SDOH — HEALTH STABILITY: MENTAL HEALTH: CURRENT SMOKER: 0

## 2024-05-29 ASSESSMENT — PAIN SCALES - GENERAL
PAINLEVEL_OUTOF10: 2
PAINLEVEL_OUTOF10: 0 - NO PAIN
PAINLEVEL_OUTOF10: 0 - NO PAIN
PAINLEVEL_OUTOF10: 6
PAINLEVEL_OUTOF10: 5 - MODERATE PAIN
PAINLEVEL_OUTOF10: 3
PAINLEVEL_OUTOF10: 3
PAINLEVEL_OUTOF10: 5 - MODERATE PAIN
PAIN_LEVEL: 2
PAINLEVEL_OUTOF10: 6
PAINLEVEL_OUTOF10: 3
PAINLEVEL_OUTOF10: 3
PAINLEVEL_OUTOF10: 0 - NO PAIN

## 2024-05-29 ASSESSMENT — PAIN - FUNCTIONAL ASSESSMENT
PAIN_FUNCTIONAL_ASSESSMENT: 0-10

## 2024-05-29 ASSESSMENT — COLUMBIA-SUICIDE SEVERITY RATING SCALE - C-SSRS
6. HAVE YOU EVER DONE ANYTHING, STARTED TO DO ANYTHING, OR PREPARED TO DO ANYTHING TO END YOUR LIFE?: NO
2. HAVE YOU ACTUALLY HAD ANY THOUGHTS OF KILLING YOURSELF?: NO
1. IN THE PAST MONTH, HAVE YOU WISHED YOU WERE DEAD OR WISHED YOU COULD GO TO SLEEP AND NOT WAKE UP?: NO

## 2024-05-29 ASSESSMENT — PAIN DESCRIPTION - LOCATION
LOCATION: ABDOMEN

## 2024-05-29 NOTE — ANESTHESIA POSTPROCEDURE EVALUATION
Patient: Avery Ortiz    Procedure Summary       Date: 05/29/24 Room / Location: [facility] OR 01 / [facility]    Anesthesia Start: 1421 Anesthesia Stop:     Procedure: Laparoscopic cholecystectomy, possible open (Abdomen) Diagnosis:       Symptomatic cholelithiasis      (Symptomatic cholelithiasis [K80.20])    Surgeons: Arleen Varela MD Responsible Provider: Celio Rushing MD    Anesthesia Type: general ASA Status: 2            Anesthesia Type: general    Vitals Value Taken Time   BP  05/29/24 1547   Temp  05/29/24 1547   Pulse 80 05/29/24 1547   Resp 12 05/29/24 1547   SpO2 99 05/29/24 1547       Anesthesia Post Evaluation    Patient location during evaluation: PACU  Patient participation: complete - patient participated  Level of consciousness: awake and alert  Pain score: 2  Pain management: adequate  Airway patency: patent  Cardiovascular status: acceptable  Respiratory status: acceptable  Hydration status: acceptable  Postoperative Nausea and Vomiting: none        No notable events documented.

## 2024-05-29 NOTE — OP NOTE
Operative Report    Patient: Avery Ortiz  : 1975  MRN: [id number]    Date of Operation: 24    Pre-Operative Diagnosis: Symptomatic Cholelithiasis  Post-Operative Diagnosis: Symptomatic Cholelithiasis  Procedure: Laparoscopic Cholecystectomy    Surgeon: Arleen Varela MD  Assistant: Mariana Godoy MD    Anesthesia: General  Findings: Cholesterolosis of the gallbladder wall, flimsy adhesions to the gallbladder. Mildly prominent node of Calot. Relatively extrahepatic gallbladder.  EBL: 10ml  Specimen: Gallbladder  Case Type: Clean-Contaminated  Disposition: PACU    Indication: Patient is a 49 y.o. male with intermittent upper abdominal pain. Ultrasound showed cholelithiasis versus a gallbladder polyp.  Risks and benefits of cholecystectomy were discussed and the patient was agreeable to proceed with surgery.    Description: The patient was brought to the operating room and placed in supine position. General anesthesia was induced. The patient's abdomen was prepped and draped. Through a vertical umbilical incision, a 12mm port was placed using Noah technique. Three 5mm ports were placed in the upper abdomen under direct visualization. The patient was placed in reverse Trendelenburg position and tilted slightly towards the left. The gallbladder appeared relatively extrahepatic. The gallbladder was grasped by the fundus and retracted cephalad. Adhesions to the infundibulum were taken down with combination of blunt dissection and electrocautery. The peritoneum was opened to separate the gallbladder neck from the liver. The cystic duct and artery were dissected and a critical view of safety was obtained. He had a mildly prominent node of Calot that was kept proximal. The cystic duct and artery were clipped, with two clips proximally and one clip distally, and divided. The gallbladder was removed from the hepatic bed using electrocautery. There was no spillage of bile at any point during the case.  Hemostasis was obtained in the hepatic bed with cautery. The right upper quadrant was irrigated, using minimal irrigation, and this returned clear. The camera was changed to a 5mm scope. The gallbladder was placed in an Endocatch bag and removed via the umbilical port. The 5mm ports were removed. The fascial defect at the umbilicus was closed with 0 Vicryl.  Skin incisions were closed with 4-0 Vicryl and steri strips. The patient tolerated the procedure well, was extubated and transferred to PACU in stable condition.    Dr. Godoy assisted by providing cephalad retraction of the gallbladder and assistance with Noah port placement. No resident was available to assist.    Arleen Varela MD  5/29/2024

## 2024-05-29 NOTE — ANESTHESIA PROCEDURE NOTES
Airway  Date/Time: 5/29/2024 2:30 PM  Urgency: elective      Staffing  Performed: attending   Authorized by: Celio Rushing MD    Performed by: Celio Rushing MD  Patient location during procedure: OR    Indications and Patient Condition  Indications for airway management: anesthesia  Spontaneous ventilation: present  Sedation level: deep  Preoxygenated: yes  Patient position: sniffing  MILS maintained throughout  Mask difficulty assessment: 1 - vent by mask  No planned trial extubation    Final Airway Details  Final airway type: endotracheal airway      Successful airway: ETT  Cuffed: yes   Successful intubation technique: video laryngoscopy  Facilitating devices/methods: intubating stylet and cricoid pressure  Blade: Joe  Blade size: #3  ETT size (mm): 7.0  Cormack-Lehane Classification: grade I - full view of glottis  Placement verified by: chest auscultation   Measured from: teeth  ETT to teeth (cm): 21  Number of attempts at approach: 1  Number of other approaches attempted: 1

## 2024-05-29 NOTE — ANESTHESIA PREPROCEDURE EVALUATION
Patient: Avery Ortiz    Procedure Information       Date/Time: 05/29/24 1150    Procedure: Laparoscopic cholecystectomy, possible open (Abdomen)    Location: [facility] OR 01 / Virtual [facility] OR    Surgeons: Arleen Varela MD            Relevant Problems   Anesthesia (within normal limits)   (-) Difficult intubation   (-) Malignant hyperthermia   (-) PONV (postoperative nausea and vomiting)      Cardiac   (+) Hypertension      Pulmonary (within normal limits)      Neuro   (+) Chronic bipolar disorder (Multi)   (+) Panic attack   (+) Situational anxiety      GI (within normal limits)      /Renal (within normal limits)      Liver   (+) Symptomatic cholelithiasis      Endocrine (within normal limits)      Hematology (within normal limits)      Musculoskeletal (within normal limits)      HEENT (within normal limits)      ID   (+) Recurrent herpes labialis      Skin (within normal limits)       Clinical information reviewed:   Tobacco  Allergies  Meds   Med Hx  Surg Hx   Fam Hx  Soc Hx        NPO Detail:  No data recorded     Physical Exam    Airway  Mallampati: II  TM distance: >3 FB  Neck ROM: full     Cardiovascular   Rhythm: regular  Rate: normal     Dental - normal exam     Pulmonary   Breath sounds clear to auscultation     Abdominal            Anesthesia Plan    History of general anesthesia?: yes  History of complications of general anesthesia?: no    ASA 2     general     The patient is not a current smoker.    intravenous induction   Postoperative administration of opioids is intended.  Anesthetic plan and risks discussed with patient.

## 2024-05-30 ENCOUNTER — TELEPHONE (OUTPATIENT)
Dept: SURGERY | Facility: CLINIC | Age: 49
End: 2024-05-30
Payer: COMMERCIAL

## 2024-05-30 NOTE — TELEPHONE ENCOUNTER
Spoke to patient after lap cholecystectomy surgery. Pt denies fever, chills, nausea, and vomiting. Pt had no questions at this time.  Provided office number to patient for any questions. Post op on 6-13-24 @ 8:30 am

## 2024-06-09 LAB
LABORATORY COMMENT REPORT: NORMAL
PATH REPORT.FINAL DX SPEC: NORMAL
PATH REPORT.GROSS SPEC: NORMAL
PATH REPORT.RELEVANT HX SPEC: NORMAL
PATH REPORT.TOTAL CANCER: NORMAL

## 2024-06-12 NOTE — PROGRESS NOTES
General Surgery Post-Operative Visit    Patient: Avery Ortiz  : 1975  MRN: [id number]  Date of Visit: 24    Chief Complaint: s/p laparoscopic cholecystectomy    History of Present Illness: Avery Ortiz is a 49 y.o. old male s/p laparoscopic cholecystectomy on 2024 for symptomatic cholelithiasis versus gallbladder polyp.  Surgical pathology showed cholesterolosis.  Prior to surgery, he was having postprandial epigastric abdominal pain with associated nausea.  This was occurring a couple times per week.  Since surgery, he has not had any return of the pain.  He had a little bit of nausea on 1 occasion.  He tried to go back to work only a couple days after surgery and was too sore at the supraumbilical incision and a little fatigued.  He therefore stayed home a few more days and is feeling much better now.  He denies any fever, redness or drainage at the incisions.  He is tolerating a diet and having regular bowel movements.    Home Medications:  Prior to Admission medications    Medication Sig Start Date End Date Taking? Authorizing Provider   acyclovir (Zovirax) 400 mg tablet Take 1 tablet (400 mg) by mouth 2 times a day. 24  Babar Brito MD   DULoxetine (Cymbalta) 60 mg DR capsule Take 1 capsule (60 mg) by mouth once daily. 24  Babar Brito MD   lamoTRIgine (LaMICtal) 200 mg tablet Take 1 tablet (200 mg) by mouth once daily. 24  Babar Brito MD   lisinopril 10 mg tablet Take 1 tablet (10 mg) by mouth once daily. 24  Babar Brito MD     Allergies:  has No Known Allergies.    ROS:  No fever, redness or drainage from incisions  Resolution of preoperative epigastric abdominal pain  No diarrhea    Physical Exam:  No physical exam performed as this was a virtual telephone follow up.    Assessment and Plan: Avery Ortiz is a 49 y.o. old male s/p laparoscopic cholecystectomy.  He is doing well without any symptoms of  postoperative complication.  He will continue to avoid any heavy lifting for another 2 weeks to minimize risk of incisional hernia, but may otherwise resume regular activity.  He may remove any remaining Steri-Strips from his incisions.  He will follow-up as needed.    Arleen Varela MD  6/12/2024

## 2024-06-13 ENCOUNTER — APPOINTMENT (OUTPATIENT)
Dept: SURGERY | Facility: CLINIC | Age: 49
End: 2024-06-13
Payer: COMMERCIAL

## 2024-06-13 DIAGNOSIS — Z48.89 POSTOPERATIVE VISIT: Primary | ICD-10-CM

## 2024-06-13 PROCEDURE — 99024 POSTOP FOLLOW-UP VISIT: CPT | Performed by: SURGERY

## 2024-06-25 ENCOUNTER — OFFICE VISIT (OUTPATIENT)
Dept: PRIMARY CARE | Facility: CLINIC | Age: 49
End: 2024-06-25
Payer: COMMERCIAL

## 2024-06-25 VITALS
RESPIRATION RATE: 16 BRPM | WEIGHT: 154.98 LBS | DIASTOLIC BLOOD PRESSURE: 74 MMHG | HEART RATE: 72 BPM | SYSTOLIC BLOOD PRESSURE: 124 MMHG | OXYGEN SATURATION: 97 % | BODY MASS INDEX: 22.89 KG/M2

## 2024-06-25 DIAGNOSIS — Z09 FOLLOW-UP EXAM: ICD-10-CM

## 2024-06-25 DIAGNOSIS — R07.81 RIB PAIN: Primary | ICD-10-CM

## 2024-06-25 PROBLEM — G89.18 POST-OPERATIVE PAIN: Status: RESOLVED | Noted: 2024-05-29 | Resolved: 2024-06-25

## 2024-06-25 PROBLEM — K80.20 SYMPTOMATIC CHOLELITHIASIS: Status: RESOLVED | Noted: 2024-05-13 | Resolved: 2024-06-25

## 2024-06-25 PROBLEM — R10.13 EPIGASTRIC PAIN: Status: ACTIVE | Noted: 2022-12-09

## 2024-06-25 PROBLEM — M25.561 BILATERAL KNEE PAIN: Status: RESOLVED | Noted: 2023-02-12 | Resolved: 2024-06-25

## 2024-06-25 PROBLEM — M25.562 BILATERAL KNEE PAIN: Status: RESOLVED | Noted: 2023-02-12 | Resolved: 2024-06-25

## 2024-06-25 PROBLEM — R07.9 CHEST PAIN: Status: ACTIVE | Noted: 2024-06-21

## 2024-06-25 PROCEDURE — 3078F DIAST BP <80 MM HG: CPT | Performed by: NURSE PRACTITIONER

## 2024-06-25 PROCEDURE — 3074F SYST BP LT 130 MM HG: CPT | Performed by: NURSE PRACTITIONER

## 2024-06-25 PROCEDURE — 1036F TOBACCO NON-USER: CPT | Performed by: NURSE PRACTITIONER

## 2024-06-25 PROCEDURE — 99213 OFFICE O/P EST LOW 20 MIN: CPT | Performed by: NURSE PRACTITIONER

## 2024-06-25 RX ORDER — LIDOCAINE 560 MG/1
1 PATCH PERCUTANEOUS; TOPICAL; TRANSDERMAL
COMMUNITY
Start: 2024-06-23 | End: 2024-07-23

## 2024-06-25 ASSESSMENT — ENCOUNTER SYMPTOMS
CONSTITUTIONAL NEGATIVE: 1
RESPIRATORY NEGATIVE: 1
PSYCHIATRIC NEGATIVE: 1
GASTROINTESTINAL NEGATIVE: 1
CARDIOVASCULAR NEGATIVE: 1

## 2024-06-25 NOTE — PROGRESS NOTES
Subjective   Patient ID: Avery Ortiz is a 49 y.o. male who presents for No chief complaint on file..    Here to follow up ER from 6/21/24  Initially went to [facility] ER in [place] and then transferred to [place]  Had awakened with CP - Lower sternal area radiating up to chest both sides. Labs from ER unremarkable. EKG and CXR ok. US LLE normal.   Feeling a little better, but still feels like can't take a full breath. When taking deep breaths will feel discomfort RUQ. Was feeling SOB Sunday and used son's Albuterol inhaler, which seemed to help.    Today just doesn't feel like can take a full breath. Not feeling SOB, feels like breathing normally.  Current pulse ox 97%         Review of Systems   Constitutional: Negative.    HENT: Negative.     Respiratory: Negative.     Cardiovascular: Negative.    Gastrointestinal: Negative.    Musculoskeletal:         Rib tenderness     Skin: Negative.    Psychiatric/Behavioral: Negative.         Objective   /74 (BP Location: Left arm)   Pulse 72   Resp 16   Wt 70.3 kg (154 lb 15.7 oz)   BMI 22.89 kg/m²     Physical Exam  Constitutional:       Appearance: Normal appearance.   HENT:      Head: Normocephalic.      Right Ear: Tympanic membrane normal.      Left Ear: Tympanic membrane normal.      Nose: Nose normal.      Mouth/Throat:      Pharynx: Oropharynx is clear.   Eyes:      Conjunctiva/sclera: Conjunctivae normal.   Cardiovascular:      Rate and Rhythm: Normal rate and regular rhythm.      Heart sounds: Normal heart sounds.   Pulmonary:      Effort: Pulmonary effort is normal.      Breath sounds: Normal breath sounds.   Abdominal:      General: Bowel sounds are normal. There is no distension.      Palpations: Abdomen is soft.      Tenderness: There is no abdominal tenderness. There is no guarding.   Musculoskeletal:      Cervical back: Neck supple.      Comments: Tenderness lower right rib   Skin:     General: Skin is warm and dry.   Neurological:       Mental Status: He is alert.   Psychiatric:         Mood and Affect: Mood normal.         Assessment/Plan   Diagnoses and all orders for this visit:  Rib pain  Follow-up exam     Pain appears to be resolving. ER reports and testing indicate symptoms likely musculoskeletal.   Today only tenderness right lower rib. Will use his lidocaine patch when he can get it from the pharmacy - they don't have it currently. Also OK to use OTC products for Musculoskeletal pain.  Follow up symptoms not resolving completely or worsens/changes/concerns

## 2024-07-02 ENCOUNTER — APPOINTMENT (OUTPATIENT)
Dept: PRIMARY CARE | Facility: CLINIC | Age: 49
End: 2024-07-02
Payer: COMMERCIAL

## 2024-11-11 ENCOUNTER — APPOINTMENT (OUTPATIENT)
Dept: PRIMARY CARE | Facility: CLINIC | Age: 49
End: 2024-11-11
Payer: COMMERCIAL

## 2024-11-15 ENCOUNTER — APPOINTMENT (OUTPATIENT)
Dept: PRIMARY CARE | Facility: CLINIC | Age: 49
End: 2024-11-15
Payer: COMMERCIAL

## 2024-11-15 VITALS
WEIGHT: 150.9 LBS | OXYGEN SATURATION: 97 % | BODY MASS INDEX: 22.28 KG/M2 | HEART RATE: 70 BPM | DIASTOLIC BLOOD PRESSURE: 80 MMHG | SYSTOLIC BLOOD PRESSURE: 130 MMHG

## 2024-11-15 DIAGNOSIS — F31.78 BIPOLAR DISORDER, IN FULL REMISSION, MOST RECENT EPISODE MIXED (MULTI): ICD-10-CM

## 2024-11-15 DIAGNOSIS — I10 PRIMARY HYPERTENSION: Primary | ICD-10-CM

## 2024-11-15 PROCEDURE — 3075F SYST BP GE 130 - 139MM HG: CPT | Performed by: FAMILY MEDICINE

## 2024-11-15 PROCEDURE — 3079F DIAST BP 80-89 MM HG: CPT | Performed by: FAMILY MEDICINE

## 2024-11-15 PROCEDURE — 1036F TOBACCO NON-USER: CPT | Performed by: FAMILY MEDICINE

## 2024-11-15 PROCEDURE — 99214 OFFICE O/P EST MOD 30 MIN: CPT | Performed by: FAMILY MEDICINE

## 2024-11-15 NOTE — PROGRESS NOTES
Subjective   Patient ID: Avery Ortiz is a 49 y.o. male who presents for Hypertension (6 mo), Panic Attack (6 mo), and Bipolar Affective Disorder (6 mo).    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       Has some PTSD              Has been having some  manic episode      still dealing with losses with family or friends      [employer]  has requested       Insmonia  shift work        Epigastric pain      EGD March 2022 mild gastritis      Dr Varela for evaluation next week for gallstones      Was going to get taken out      But due to job was not able to get surgery      Works for [employer]  and needs note that he has gallbladder illness      Since had GB removed and much better      Colonosocpy this year was normal      HTN stable on meds      Acyclovir  for oral herpes simplex no outbreaks       Pt concerned about weight but is in normal range  And no signs or symptoms of  colitis or malabsorption  Review of Systems    Objective   /80   Pulse 70   Wt 68.4 kg (150 lb 14.4 oz)   SpO2 97%   BMI 22.28 kg/m²     Physical Exam  Constitutional:       Appearance: Normal appearance.   HENT:      Head: Normocephalic and atraumatic.   Eyes:      Conjunctiva/sclera: Conjunctivae normal.      Pupils: Pupils are equal, round, and reactive to light.   Cardiovascular:      Rate and Rhythm: Normal rate and regular rhythm.      Heart sounds: Normal heart sounds.   Pulmonary:      Effort: Pulmonary effort is normal.      Breath sounds: Normal breath sounds.   Lymphadenopathy:      Cervical: No cervical adenopathy.   Skin:     Coloration: Skin is not jaundiced.   Neurological:       General: No focal deficit present.      Mental Status: He is alert and oriented to person, place, and time.   Psychiatric:         Mood and Affect: Mood normal.         Behavior: Behavior normal.         Thought Content: Thought content normal.         Judgment: Judgment normal.         Assessment/Plan   Diagnoses and all orders for this visit:  Primary hypertension  Bipolar disorder, in full remission, most recent episode mixed (Multi)

## 2025-02-11 ENCOUNTER — APPOINTMENT (OUTPATIENT)
Dept: PRIMARY CARE | Facility: CLINIC | Age: 50
End: 2025-02-11

## 2025-02-11 VITALS
HEART RATE: 84 BPM | DIASTOLIC BLOOD PRESSURE: 78 MMHG | WEIGHT: 155.6 LBS | SYSTOLIC BLOOD PRESSURE: 110 MMHG | OXYGEN SATURATION: 96 % | BODY MASS INDEX: 22.98 KG/M2

## 2025-02-11 DIAGNOSIS — M23.91 INTERNAL DERANGEMENT OF RIGHT KNEE: Primary | ICD-10-CM

## 2025-02-11 PROCEDURE — 3078F DIAST BP <80 MM HG: CPT | Performed by: FAMILY MEDICINE

## 2025-02-11 PROCEDURE — 3074F SYST BP LT 130 MM HG: CPT | Performed by: FAMILY MEDICINE

## 2025-02-11 PROCEDURE — 99214 OFFICE O/P EST MOD 30 MIN: CPT | Performed by: FAMILY MEDICINE

## 2025-02-11 RX ORDER — KETOROLAC TROMETHAMINE 10 MG/1
1 TABLET, FILM COATED ORAL EVERY 6 HOURS
COMMUNITY
Start: 2025-01-30

## 2025-02-11 NOTE — LETTER
February 11, 2025     Patient: Avery Ortiz   YOB: 1975   Date of Visit: 2/11/2025       To Whom It May Concern:    Avery Ortiz was seen in my clinic on 2/11/2025 at 10:20 am. Please excuse Avery for his absence from work on this day to make the appointment.    Return to work February 17, 2025 with no restrictions.     If you have any questions or concerns, please don't hesitate to call.         Sincerely,         Babar Brito MD        CC: No Recipients

## 2025-02-11 NOTE — PROGRESS NOTES
Subjective   Patient ID: Avery Ortiz is a 49 y.o. male who presents for ER Follow-up (1/30/25 [facility]; skiing accident, right knee and lower leg with swelling, does not have full range of motion, difficulty laying on his side due to pain in his leg, xray was normal).    HPI Medial right knee pain is worse but radieat laterally abouve and below knee  Certain movements cause severe pain and unable to sleep on the right side   Stairs hurt   Knee immobilizer wears prn when pain   Crutches the first day     DOI  jan 29    At [place]     Skiing      Lateral twisting and then medially   Popping and swelling immediately knee down 2 to 3 x the normal size   Now still a little swelling   Took left over pt viocodin and now advil     With walking not unstable   Some clicking and popping   Pain with waling 4/10         Review of Systems    Objective   /78   Pulse 84   Wt 70.6 kg (155 lb 9.6 oz)   SpO2 96%   BMI 22.98 kg/m²     Physical Exam  Constitutional:       Appearance: He is normal weight.   Musculoskeletal:      Comments: MCL right knee very tender superior and over joint line   Lachmans is lax  Dec rom with flexion   Mild small effusion   Tender suprapatellar bursa   Antalgic gait    Neurological:      General: No focal deficit present.      Mental Status: He is alert.   Psychiatric:         Mood and Affect: Mood normal.         Thought Content: Thought content normal.         Assessment/Plan   Diagnoses and all orders for this visit:  Internal derangement of right knee  -     Referral to Physical Therapy; Future       Rtw 2/17/2025 s restrictions   HEP given   4 weeks no better will need mri knee

## 2025-02-13 ENCOUNTER — EVALUATION (OUTPATIENT)
Dept: PHYSICAL THERAPY | Facility: CLINIC | Age: 50
End: 2025-02-13
Payer: COMMERCIAL

## 2025-02-13 DIAGNOSIS — M23.91 INTERNAL DERANGEMENT OF RIGHT KNEE: ICD-10-CM

## 2025-02-13 PROCEDURE — 97110 THERAPEUTIC EXERCISES: CPT | Mod: GP

## 2025-02-13 PROCEDURE — 97161 PT EVAL LOW COMPLEX 20 MIN: CPT | Mod: GP

## 2025-02-13 ASSESSMENT — PATIENT HEALTH QUESTIONNAIRE - PHQ9
SUM OF ALL RESPONSES TO PHQ9 QUESTIONS 1 AND 2: 0
1. LITTLE INTEREST OR PLEASURE IN DOING THINGS: NOT AT ALL
2. FEELING DOWN, DEPRESSED OR HOPELESS: NOT AT ALL

## 2025-02-13 ASSESSMENT — ENCOUNTER SYMPTOMS
DEPRESSION: 0
LOSS OF SENSATION IN FEET: 0
OCCASIONAL FEELINGS OF UNSTEADINESS: 0

## 2025-02-13 ASSESSMENT — PAIN - FUNCTIONAL ASSESSMENT: PAIN_FUNCTIONAL_ASSESSMENT: 0-10

## 2025-02-13 ASSESSMENT — PAIN SCALES - GENERAL: PAINLEVEL_OUTOF10: 8

## 2025-02-13 ASSESSMENT — ACTIVITIES OF DAILY LIVING (ADL): EFFECT OF PAIN ON DAILY ACTIVITIES: SEE GOALS

## 2025-02-13 NOTE — PROGRESS NOTES
Patient Name: Avery Ortiz  MRN: [id number]  Today's Date: 2025  : 1975  Babar Brito  Time Calculation  Start Time: 845  Stop Time: 930  Time Calculation (min): 45 min    PT Evaluation Time Entry  PT Evaluation (Low) Time Entry: 30   PT Therapeutic Procedures Time Entry  Therapeutic Exercise Time Entry: 12                         Assessment:  Rehab Prognosis: Good  Barriers to Participation:  (none)    Plan:  PT Plan: Skilled PT  Onset Date: 25  Rehab Potential: Good  Plan of Care Agreement: Patient    Current Problem:   1. 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:   (see goals)  Testing*:25 knee films are neg    Physical Findings*: Limited: AROM ( R knee )                                                Strength ( R knee )                                             POC:  Ongoing clinic PT to include:continue with open to closed chain knee ROM/PRE as malick (MMT?)    Other: (copay*?- 50 ) (fall risk*?-   ) (ins visit limit*?- 100  )     Precautions:  Precautions  STEADI Fall Risk Score (The score of 4 or more indicates an increased risk of falling): 1  Precautions Comment: bipolar; chect pain    Pain:  Pain Assessment  Pain Assessment: 0-10  0-10 (Numeric) Pain Score: 8 (max sx)  Pain Location: Knee  Pain Orientation: Right  Pain Onset:  ( skiing accident)  Effect of Pain on Daily Activities: see goals    Prior Level of Function:  Prior Function Per Pt/Caregiver Report  Vocational: Full time employment ()    Objective     Outcome Measures:  Other Measures  Lower Extremity Funtional Score (LEFS): 52     Treatments:  SAQ  "x10  QS x10  R heel slide with strap 10 x 3 count hold  Seated HSC x10 (manual today)  Sdg TKE x10 mint  POC:  Ongoing clinic PT to include:continue with open to closed chain knee ROM/PRE as malick (MMT?)    OP EDUCATION:  Access Code: [email or web address]  URL: [email or web address]  Date: 02/13/2025  Prepared by: Avery Max    Exercises  - Supine Knee Extension Strengthening (Mirrored)   - Supine Quad Set (Mirrored)   - Sitting Heel Slide with Towel   - Seated Hamstring Curl with Anchored Resistance   - Standing Terminal Knee Extension with Resistance     Goals:  Active       PT Problem       PT Goal 1       Start:  02/13/25    Expected End:  05/14/25       1. Independent HEP to allow for 50% reduction in max ADL C/C sx ( 8/10) 2-3wks  2. 0/10 night time sx to allow for uninterrupted sleep x 1wk ( 7/7) 2-3wks  3. Survey score improvement from 52/80 to 60/80 (LEFS) 3-4wks  4. Strength increase to allow for improved ADL stairs (from 4/5 to 5/5 R knee flex/ext) 3-4wks  5. ROM increase to allow for improved ADL dressing lowers (from  115 to 135 knee flexion) 3-4wk         PT Goal 2       Start:  02/13/25    Expected End:  05/14/25       1. \"I want my knee   to feel better\".              KNEE          Knee Observation  Observation Comment: neg sx wth low leg longitudnal rotation         Knee AROM WFL unless documented below  R knee flexion: (140°): 115  L knee flexion: (140°): 140  R knee extension: (0°): neut (pain with overpressure)  L knee extension: (0°): neut  Lower Extremity Strength:WFL unless documented  MMT 5/5 max  RIGHT LEFT   Hip Flexion     5/5    5 /5   Hip Extension     5/5    5 /5   Hip Abduction     5/5    5 /5        Knee Extension    4 /5 mild pain     5/5   Knee Flexion    4 /5 mild pain    5 /5                                    "

## 2025-02-20 ENCOUNTER — TREATMENT (OUTPATIENT)
Dept: PHYSICAL THERAPY | Facility: CLINIC | Age: 50
End: 2025-02-20
Payer: COMMERCIAL

## 2025-02-20 ENCOUNTER — TELEPHONE (OUTPATIENT)
Dept: PRIMARY CARE | Facility: CLINIC | Age: 50
End: 2025-02-20

## 2025-02-20 DIAGNOSIS — M23.91 INTERNAL DERANGEMENT OF RIGHT KNEE: ICD-10-CM

## 2025-02-20 PROCEDURE — 97110 THERAPEUTIC EXERCISES: CPT | Mod: GP

## 2025-02-20 ASSESSMENT — PAIN - FUNCTIONAL ASSESSMENT: PAIN_FUNCTIONAL_ASSESSMENT: 0-10

## 2025-02-20 NOTE — TELEPHONE ENCOUNTER
02/20/25  Patient dropped off paperwork to be completed for work.  He will pick it up when it is completed.      Ph: [phone]

## 2025-02-20 NOTE — PROGRESS NOTES
"Physical Therapy Treatment    Patient Name: Avery Ortiz  MRN: [id number]  : 1975  Today's Date: 2025  Babar Brito  Time Calculation  Start Time: 837  Stop Time: 903  Time Calculation (min): 26 min      PT Therapeutic Procedures Time Entry  Therapeutic Exercise Time Entry:                          General:  General  Reason for Referral: Internal dreangement R knee  Referred By: Alisha  Visit #2    Current Problem  Problem List Items Addressed This Visit             ICD-10-CM    Internal derangement of right knee M23.91         Assessment:Patient identity confirmed today with name/. ;  ( 0 ) falls since last clinic visit;  modified/added to clinic/HEP today;  no C/O pain with exercise additions today      Plan:  PT Plan: Skilled PT  Onset Date: 25  Rehab Potential: Good  Plan of Care Agreement: Patient  Continue 1x/wk x 2-3wks with exercise progression as malick toward functional gait goals    Subjective: I have  2 /10 pain right now.         Precautions  Precautions  Precautions Comment: bipolar; chect pain      Pain  Pain Assessment: 0-10  Pain Location: Knee  Pain Orientation: Right    Objective    Manual:__0___min    There ex:_25____min  Sci fit 5' Lv1  SAQ x20 P  QS x20 P  R heel slide with strap 10 x 3 count hold  Bridge x20 N  Uni alt bridge jared x10 ea N  Seated HSC 2x10 (manual today) P  Sdg TKE x20 purple P  Wall march x10 ea N  R SLS 2x20\" N  Stdg B min squat x20 N    Fwd/lat step up-A  kariocas-A  POC:  Ongoing clinic PT to include:continue with open to closed chain knee ROM/PRE as malick (MMT?)     OP EDUCATION:  Access Code: [email or web address]  URL: [email or web address]  Date: 2025  Prepared by: Avery Max     Exercises  - Supine Knee Extension Strengthening (Mirrored)   - Supine Quad Set (Mirrored)   - Sitting Heel Slide with Towel   - Seated Hamstring Curl with Anchored Resistance   - Standing Terminal Knee Extension with Resistance     OP " "EDUCATION:  Access Code: [email or web address]  URL: [email or web address]  Date: 02/20/2025  Prepared by: Avery Max    Exercises  - Supine Bridge   - Marching Bridge   - Wall Squat with Leg Lifts   - Standing Single Leg Stance with Counter Support   - Squat at Table     Goals:  Active       PT Problem       PT Goal 1       Start:  02/13/25    Expected End:  05/14/25       1. Independent HEP to allow for 50% reduction in max ADL C/C sx ( 8/10) 2-3wks  2. 0/10 night time sx to allow for uninterrupted sleep x 1wk ( 7/7) 2-3wks  3. Survey score improvement from 52/80 to 60/80 (LEFS) 3-4wks  4. Strength increase to allow for improved ADL stairs (from 4/5 to 5/5 R knee flex/ext) 3-4wks  5. ROM increase to allow for improved ADL dressing lowers (from  115 to 135 knee flexion) 3-4wk         PT Goal 2       Start:  02/13/25    Expected End:  05/14/25       1. \"I want my knee   to feel better\".             "

## 2025-02-26 NOTE — TELEPHONE ENCOUNTER
Patient called to see if the form he dropped off has been completed so he can pick it up.    Do you still have this form?  Please advise.

## 2025-02-27 ENCOUNTER — DOCUMENTATION (OUTPATIENT)
Dept: PHYSICAL THERAPY | Facility: CLINIC | Age: 50
End: 2025-02-27
Payer: COMMERCIAL

## 2025-02-27 DIAGNOSIS — M23.91 INTERNAL DERANGEMENT OF RIGHT KNEE: ICD-10-CM

## 2025-02-27 NOTE — PROGRESS NOTES
Physical Therapy                 Therapy Communication Note    Patient Name: Avery Ortiz  MRN: [id number]  Department:   Room: Room/bed info not found  Today's Date: 2/27/2025     Discipline: Physical Therapy          Missed Visit Reason:      Missed Time: No Show    Comment:

## 2025-03-06 ENCOUNTER — TREATMENT (OUTPATIENT)
Dept: PHYSICAL THERAPY | Facility: CLINIC | Age: 50
End: 2025-03-06
Payer: COMMERCIAL

## 2025-03-06 DIAGNOSIS — M23.91 INTERNAL DERANGEMENT OF RIGHT KNEE: ICD-10-CM

## 2025-03-06 PROCEDURE — 97110 THERAPEUTIC EXERCISES: CPT | Mod: GP

## 2025-03-06 ASSESSMENT — PAIN - FUNCTIONAL ASSESSMENT: PAIN_FUNCTIONAL_ASSESSMENT: 0-10

## 2025-03-06 ASSESSMENT — PAIN SCALES - GENERAL: PAINLEVEL_OUTOF10: 0 - NO PAIN

## 2025-03-06 NOTE — PROGRESS NOTES
"Physical Therapy Treatment    Patient Name: Avery Ortiz  MRN: [id number]  : 1975  Today's Date: 3/6/2025  Babar Brito  Time Calculation  Start Time: 849  Stop Time: 922  Time Calculation (min): 33 min      PT Therapeutic Procedures Time Entry  Therapeutic Exercise Time Entry: 33                         General:  General  Reason for Referral: Internal dreangement R knee  Referred By: Alisha  Visit #3    Current Problem  Problem List Items Addressed This Visit             ICD-10-CM    Internal derangement of right knee M23.91         Assessment:Patient identity confirmed today with name/. ;  ( 0 ) falls since last clinic visit; modified/added to clinic/HEP today;       Plan:  PT Plan: Skilled PT  Onset Date: 25  Rehab Potential: Good  Plan of Care Agreement: Patient  Continue with clinic rehab treatments toward functional goals ( gait);  discharge to ongoing HEP PRN (copay)    Subjective: I have  0 /10 pain right now.         Precautions  Precautions  Precautions Comment: bipolar; chect pain      Pain  Pain Assessment: 0-10  0-10 (Numeric) Pain Score: 0 - No pain  Pain Location: Knee  Pain Orientation: Right    Objective    Manual:___0__min    There ex:__33___min  Sci fit 5' Lv1  SAQ x20 P  QS x20 P  R heel slide with strap 10 x 3 count hold  Bridge x20   Uni alt bridge jared 2x10 ea P  Seated HSC 2x10 (manual today)   Sdg TKE x20 purple   Wall march 2x10 ea P  R SLS 2x20\" on airex P  Stdg B min squat x20    Fwd/lat step up-2x10 6\"  kariocas-A  POC:  Ongoing clinic PT to include:continue with open to closed chain knee ROM/PRE as malick (MMT?)     OP EDUCATION:  Access Code: [email or web address]  URL: [email or web address]  Date: 2025  Prepared by: Avery Max     Exercises  - Supine Knee Extension Strengthening (Mirrored)   - Supine Quad Set (Mirrored)   - Sitting Heel Slide with Towel   - Seated Hamstring Curl with Anchored Resistance   - Standing Terminal Knee Extension " "with Resistance      OP EDUCATION:  Access Code: [email or web address]  URL: [email or web address]  Date: 02/20/2025  Prepared by: Avery Max     Exercises  - Supine Bridge   - Marching Bridge   - Wall Squat with Leg Lifts   - Standing Single Leg Stance with Counter Support   - Squat at Table        OP EDUCATION:  Access Code: [email or web address]  URL: [email or web address]  Date: 03/06/2025  Prepared by: Avery Max    Exercises  - Step Up (Mirrored)   - Lateral Step Ups   - Braided Sidestepping     Goals:  Active       PT Problem       PT Goal 1       Start:  02/13/25    Expected End:  05/14/25       1. Independent HEP to allow for 50% reduction in max ADL C/C sx ( 8/10) 2-3wks  2. 0/10 night time sx to allow for uninterrupted sleep x 1wk ( 7/7) 2-3wks  3. Survey score improvement from 52/80 to 60/80 (LEFS) 3-4wks  4. Strength increase to allow for improved ADL stairs (from 4/5 to 5/5 R knee flex/ext) 3-4wks  5. ROM increase to allow for improved ADL dressing lowers (from  115 to 135 knee flexion) 3-4wk         PT Goal 2       Start:  02/13/25    Expected End:  05/14/25       1. \"I want my knee   to feel better\".             "

## 2025-03-11 ENCOUNTER — APPOINTMENT (OUTPATIENT)
Dept: PRIMARY CARE | Facility: CLINIC | Age: 50
End: 2025-03-11
Payer: COMMERCIAL

## 2025-03-11 VITALS
HEART RATE: 66 BPM | DIASTOLIC BLOOD PRESSURE: 72 MMHG | WEIGHT: 157.3 LBS | BODY MASS INDEX: 23.23 KG/M2 | OXYGEN SATURATION: 93 % | SYSTOLIC BLOOD PRESSURE: 120 MMHG

## 2025-03-11 DIAGNOSIS — M25.561 ACUTE PAIN OF RIGHT KNEE: Primary | ICD-10-CM

## 2025-03-11 PROCEDURE — 3078F DIAST BP <80 MM HG: CPT | Performed by: FAMILY MEDICINE

## 2025-03-11 PROCEDURE — 3074F SYST BP LT 130 MM HG: CPT | Performed by: FAMILY MEDICINE

## 2025-03-11 PROCEDURE — 99213 OFFICE O/P EST LOW 20 MIN: CPT | Performed by: FAMILY MEDICINE

## 2025-03-11 NOTE — PROGRESS NOTES
Subjective   Patient ID: Avery Ortiz is a 50 y.o. male who presents for Follow-up (Right knee internal derangement; started PT on 2/13/25 and is going well and less pain, no immobilizer today ).    HPI   Clicking feeling of unstable or instability no swelling no catching full range of motion  Patient has returned to work    Not needing  toradol     PT x 4   Feels soreness after session for about an hour   No ice  No knee sleeve   No braces       Review of Systems    Objective   /72   Pulse 66   Wt 71.4 kg (157 lb 4.8 oz)   SpO2 93%   BMI 23.23 kg/m²     Physical Exam  Constitutional:       Appearance: He is normal weight.   Musculoskeletal:      Comments: Right knee no stockinettes of instability with Lachman's or anterior drawer.  There is no joint line tenderness negative Washington's test no effusion no patellar apprehension full range of motion with extension and flexion no deformities normal gait   Neurological:      General: No focal deficit present.      Mental Status: He is alert.   Psychiatric:         Mood and Affect: Mood normal.         Assessment/Plan   Diagnoses and all orders for this visit:  Acute pain of right knee  Continue physical therapy as needed.    Do not feel MRI is necessary at this point

## 2025-03-18 ENCOUNTER — OFFICE VISIT (OUTPATIENT)
Dept: PRIMARY CARE | Facility: CLINIC | Age: 50
End: 2025-03-18
Payer: COMMERCIAL

## 2025-03-18 VITALS
OXYGEN SATURATION: 96 % | WEIGHT: 155 LBS | HEART RATE: 66 BPM | BODY MASS INDEX: 22.89 KG/M2 | SYSTOLIC BLOOD PRESSURE: 120 MMHG | DIASTOLIC BLOOD PRESSURE: 80 MMHG

## 2025-03-18 DIAGNOSIS — N45.1 EPIDIDYMITIS: Primary | ICD-10-CM

## 2025-03-18 PROCEDURE — 3079F DIAST BP 80-89 MM HG: CPT

## 2025-03-18 PROCEDURE — 99213 OFFICE O/P EST LOW 20 MIN: CPT

## 2025-03-18 PROCEDURE — 1036F TOBACCO NON-USER: CPT

## 2025-03-18 PROCEDURE — 3074F SYST BP LT 130 MM HG: CPT

## 2025-03-18 RX ORDER — LEVOFLOXACIN 500 MG/1
500 TABLET, FILM COATED ORAL DAILY
Qty: 10 TABLET | Refills: 0 | Status: SHIPPED | OUTPATIENT
Start: 2025-03-18 | End: 2025-03-28

## 2025-03-18 ASSESSMENT — ENCOUNTER SYMPTOMS
SHORTNESS OF BREATH: 0
WEAKNESS: 0
POLYDIPSIA: 0
UNEXPECTED WEIGHT CHANGE: 0
FREQUENCY: 0
ARTHRALGIAS: 0
TROUBLE SWALLOWING: 0
DIAPHORESIS: 0
WOUND: 0
POLYPHAGIA: 0
DIARRHEA: 0
ABDOMINAL PAIN: 0
NERVOUS/ANXIOUS: 0
HEADACHES: 0
DIFFICULTY URINATING: 0
CHILLS: 0
PALPITATIONS: 0
DYSURIA: 0
RECTAL PAIN: 0
FATIGUE: 0
CHEST TIGHTNESS: 0
NUMBNESS: 0
CONSTIPATION: 0
MYALGIAS: 0
NAUSEA: 0

## 2025-03-18 NOTE — PROGRESS NOTES
Subjective   Patient ID: Avery Ortiz is a 50 y.o. male who presents for Testicle Pain (Right side).    Patient presents today for complaint of testicular pain.    Epididymitis: Patient states he has a previous history of epididymitis treated by Dr. Brito in the past.  Presents today with complaint of scrotal pain, increased pain with palpation of right epididymis , inflammation noted on palpation.  No pain or inflammation of testicle.  No testicular swelling. No urinary symptoms. Symptoms have been present for over 48 hours.  Explained the reasons for emergent evaluation the patient, placed him on Levaquin for treatment.  He will report any worsening symptoms to us or emergency medicine.      Testicle Pain  The patient's primary symptoms include testicular pain. The patient's pertinent negatives include no penile discharge, penile pain or scrotal swelling. Pertinent negatives include no abdominal pain, chest pain, chills, constipation, diarrhea, dysuria, frequency, headaches, nausea, shortness of breath or urgency.        Review of Systems   Constitutional:  Negative for chills, diaphoresis, fatigue and unexpected weight change.   HENT:  Negative for dental problem, tinnitus and trouble swallowing.    Eyes:  Negative for visual disturbance.   Respiratory:  Negative for chest tightness and shortness of breath.    Cardiovascular:  Negative for chest pain, palpitations and leg swelling.   Gastrointestinal:  Negative for abdominal pain, constipation, diarrhea, nausea and rectal pain.   Endocrine: Negative for polydipsia, polyphagia and polyuria.   Genitourinary:  Positive for testicular pain. Negative for difficulty urinating, dysuria, frequency, penile discharge, penile pain, penile swelling, scrotal swelling and urgency.   Musculoskeletal:  Negative for arthralgias and myalgias.   Skin:  Negative for pallor and wound.   Neurological:  Negative for syncope, weakness, numbness and headaches.    Psychiatric/Behavioral:  Negative for suicidal ideas. The patient is not nervous/anxious.        Objective   /80 (BP Location: Left arm, Patient Position: Sitting)   Pulse 66   Wt 70.3 kg (155 lb)   SpO2 96%   BMI 22.89 kg/m²     Physical Exam  Vitals and nursing note reviewed.   Constitutional:       General: He is not in acute distress.     Appearance: Normal appearance.   HENT:      Head: Normocephalic.      Nose: Nose normal.      Mouth/Throat:      Mouth: Mucous membranes are moist.      Pharynx: Oropharynx is clear.   Eyes:      General: No scleral icterus.     Pupils: Pupils are equal, round, and reactive to light.   Neck:      Vascular: No carotid bruit.   Cardiovascular:      Rate and Rhythm: Normal rate.      Pulses: Normal pulses.   Pulmonary:      Effort: Pulmonary effort is normal.   Abdominal:      General: Bowel sounds are normal. There is no distension.      Palpations: Abdomen is soft.      Tenderness: There is no abdominal tenderness. There is no right CVA tenderness or left CVA tenderness.   Genitourinary:     Testes: Normal.      Comments: Right epididymis inflamed, no testicular enlargement, visual inspection of penis and scrotum normal  Musculoskeletal:         General: No swelling. Normal range of motion.      Cervical back: Normal range of motion.      Right lower leg: No edema.      Left lower leg: No edema.   Skin:     General: Skin is warm and dry.      Capillary Refill: Capillary refill takes less than 2 seconds.      Findings: No rash.   Neurological:      General: No focal deficit present.      Mental Status: He is alert and oriented to person, place, and time. Mental status is at baseline.   Psychiatric:         Mood and Affect: Mood normal.         Behavior: Behavior normal.         Thought Content: Thought content normal.         Judgment: Judgment normal.         Assessment/Plan   Diagnoses and all orders for this visit:  Epididymitis  -     levoFLOXacin (Levaquin) 500  mg tablet; Take 1 tablet (500 mg) by mouth once daily for 10 days.

## 2025-04-23 ENCOUNTER — DOCUMENTATION (OUTPATIENT)
Dept: PHYSICAL THERAPY | Facility: CLINIC | Age: 50
End: 2025-04-23
Payer: COMMERCIAL

## 2025-04-23 DIAGNOSIS — M23.91 INTERNAL DERANGEMENT OF RIGHT KNEE: ICD-10-CM

## 2025-05-08 ENCOUNTER — TELEPHONE (OUTPATIENT)
Dept: PRIMARY CARE | Facility: CLINIC | Age: 50
End: 2025-05-08
Payer: COMMERCIAL

## 2025-05-08 DIAGNOSIS — F31.30 BIPOLAR AFFECTIVE DISORDER, CURRENT EPISODE DEPRESSED, CURRENT EPISODE SEVERITY UNSPECIFIED (MULTI): ICD-10-CM

## 2025-05-08 DIAGNOSIS — I10 HYPERTENSION, UNSPECIFIED TYPE: ICD-10-CM

## 2025-05-08 DIAGNOSIS — B00.1 RECURRENT HERPES LABIALIS: ICD-10-CM

## 2025-05-08 DIAGNOSIS — F41.0 PANIC ATTACK: ICD-10-CM

## 2025-05-08 RX ORDER — DULOXETIN HYDROCHLORIDE 60 MG/1
60 CAPSULE, DELAYED RELEASE ORAL DAILY
Qty: 90 CAPSULE | Refills: 3 | OUTPATIENT
Start: 2025-05-08

## 2025-05-08 RX ORDER — ACYCLOVIR 400 MG/1
400 TABLET ORAL 2 TIMES DAILY
Qty: 180 TABLET | Refills: 3 | OUTPATIENT
Start: 2025-05-08

## 2025-05-08 RX ORDER — LAMOTRIGINE 200 MG/1
TABLET ORAL DAILY
Qty: 90 TABLET | Refills: 3 | OUTPATIENT
Start: 2025-05-08

## 2025-05-08 RX ORDER — LISINOPRIL 10 MG/1
10 TABLET ORAL DAILY
Qty: 90 TABLET | Refills: 3 | OUTPATIENT
Start: 2025-05-08

## 2025-05-15 ENCOUNTER — APPOINTMENT (OUTPATIENT)
Dept: PRIMARY CARE | Facility: CLINIC | Age: 50
End: 2025-05-15
Payer: COMMERCIAL

## 2025-05-16 DIAGNOSIS — I10 HYPERTENSION, UNSPECIFIED TYPE: ICD-10-CM

## 2025-05-16 DIAGNOSIS — F41.0 PANIC ATTACK: ICD-10-CM

## 2025-05-16 DIAGNOSIS — F31.30 BIPOLAR AFFECTIVE DISORDER, CURRENT EPISODE DEPRESSED, CURRENT EPISODE SEVERITY UNSPECIFIED (MULTI): ICD-10-CM

## 2025-05-16 DIAGNOSIS — B00.1 RECURRENT HERPES LABIALIS: ICD-10-CM

## 2025-05-16 RX ORDER — LAMOTRIGINE 200 MG/1
200 TABLET ORAL DAILY
Qty: 90 TABLET | Refills: 1 | Status: SHIPPED | OUTPATIENT
Start: 2025-05-16

## 2025-05-16 RX ORDER — LISINOPRIL 10 MG/1
10 TABLET ORAL DAILY
Qty: 90 TABLET | Refills: 1 | Status: SHIPPED | OUTPATIENT
Start: 2025-05-16

## 2025-05-16 RX ORDER — ACYCLOVIR 400 MG/1
400 TABLET ORAL 2 TIMES DAILY
Qty: 180 TABLET | Refills: 1 | Status: SHIPPED | OUTPATIENT
Start: 2025-05-16 | End: 2026-05-16

## 2025-05-16 RX ORDER — DULOXETIN HYDROCHLORIDE 60 MG/1
60 CAPSULE, DELAYED RELEASE ORAL DAILY
Qty: 90 CAPSULE | Refills: 1 | Status: SHIPPED | OUTPATIENT
Start: 2025-05-16

## (undated) DEVICE — STRAP, KNEE AND BODY, SINGLE USE

## (undated) DEVICE — CLIP, ENDO APPLIER LIGAMAX 5MM

## (undated) DEVICE — VISIBILITY SYSTEM, LAPAROVUE

## (undated) DEVICE — TOWEL, OR, XRAY DECTECTABLE, 17 X 27, BLUE, 4/PK, STERILE

## (undated) DEVICE — TUBE SET, PNEUMOLAR HEATED, SMOKE EVACU, HIGH-FLOW

## (undated) DEVICE — SOLUTION, INJECTION, USP, SODIUM CHLORIDE 0.9%, .9, NACL, 1000 ML, BAG

## (undated) DEVICE — Device

## (undated) DEVICE — BLANKET, HYPERTHERMIA, UPPER BODY

## (undated) DEVICE — TROCAR SYSTEM, BALLOON, KII GELPORT, 12 X 100MM

## (undated) DEVICE — TIP, RENEW PROBE, L-HOOK CAUTERY, DISP

## (undated) DEVICE — SUTURE, VICRYL, 4-0, 18 IN, UNDYED BR PS-2

## (undated) DEVICE — TROCAR, KII OPTICAL BLADELESS 5MM Z THREAD 100MM LNGTH

## (undated) DEVICE — GLOVE, SURGICAL, PROTEXIS PI BLUE W/NEUTHERA, 6.5, PF, LF

## (undated) DEVICE — SCISSOR TIP, ENDOCUT, LAPAROSCOPIC

## (undated) DEVICE — GOWN, STANDARD, ULTRA, X-LARGE, LF

## (undated) DEVICE — ASSEMBLY, STRYKER FLOW 2, SUCTION IRRIGATOR, WITH TIP

## (undated) DEVICE — GRASPER TIP, LAPCLINCH, DISP

## (undated) DEVICE — NEEDLE, ECLIPSE, 25GA X 1-1/2 IN

## (undated) DEVICE — APPLICATOR, CHLORAPREP, W/ORANGE TINT, 26ML

## (undated) DEVICE — TUBING SET, INSUFFLATION

## (undated) DEVICE — POUCH, SPECIMEN, ENDOCATCH, 10 MM

## (undated) DEVICE — DRAPE, SURGICAL, LAP CHOLY, 76 X 120

## (undated) DEVICE — STRIP, SKIN CLOSURE, STERI STRIP, REINFORCED, 0.5 X 4 IN

## (undated) DEVICE — MASK, FACE, ANESTHESIA, ADULT, LARGE, P6, RED

## (undated) DEVICE — GLOVE, PROTEXIS PI CLASSIC, SZ-6.5, PF, LF

## (undated) DEVICE — STRAP, ARMBOARD, 3IN, BLUE/WHITE, SECURE HOOK

## (undated) DEVICE — RETRIEVAL SYSTEM, MONARCH, 10MM DISP ENDOSCOPIC

## (undated) DEVICE — CIRCUIT, BREATHING, ADULT, B/V FILTER, HMEF, 3 L BAG, 108 IN

## (undated) DEVICE — SUTURE, VICRYL, 0, 27 IN, UR-6, VIOLET

## (undated) DEVICE — GRASPER, TRADITIONAL MARYLAND TIP

## (undated) DEVICE — SYRINGE, 10 ML, 21 G X 1 0.5 IN, LUER LOK